# Patient Record
Sex: FEMALE | Race: WHITE
[De-identification: names, ages, dates, MRNs, and addresses within clinical notes are randomized per-mention and may not be internally consistent; named-entity substitution may affect disease eponyms.]

---

## 2018-05-04 ENCOUNTER — HOSPITAL ENCOUNTER (OUTPATIENT)
Dept: HOSPITAL 76 - DI.S | Age: 23
Discharge: HOME | End: 2018-05-04
Attending: NURSE PRACTITIONER
Payer: MEDICAID

## 2018-05-04 DIAGNOSIS — M25.512: Primary | ICD-10-CM

## 2018-05-04 NOTE — XRAY REPORT
TWO-VIEW LEFT SHOULDER:  05/04/2018

 

CLINICAL INDICATION:  Pain.

 

FINDINGS:  Frontal and scapular Y views of the left shoulder demonstrate no evidence of 

fracture or dislocation.  The joint spaces are preserved.  No radiopaque foreign body is seen 

in the soft tissues.

 

IMPRESSION:  NORMAL LEFT SHOULDER.

 

 

DD: 05/04/2018 11:03

TD: 05/04/2018 12:55

Job #: [id number]

## 2018-07-26 ENCOUNTER — HOSPITAL ENCOUNTER (OUTPATIENT)
Dept: HOSPITAL 76 - DI | Age: 23
Discharge: HOME | End: 2018-07-26
Attending: NURSE PRACTITIONER
Payer: MEDICAID

## 2018-07-26 DIAGNOSIS — M75.42: ICD-10-CM

## 2018-07-26 DIAGNOSIS — M25.512: Primary | ICD-10-CM

## 2018-07-26 NOTE — MRI REPORT
Procedure Date:  07/26/2018   

Accession Number:  [id number] / [id number]                    

Procedure:  MRI - Shoulder LT W/O CPT Code:  

 

FULL RESULT:

 

 

EXAM:

LEFT SHOULDER MRI WITHOUT CONTRAST

 

EXAM DATE: 7/26/2018 01:28 PM.

 

CLINICAL HISTORY: Pain in left shoulder, impingement syndrome.

 

COMPARISON: None.

 

TECHNIQUE: Multiplanar, multisequence T1-weighted and fluid-sensitive 

sequences of the shoulder without contrast. Other: None.

 

FINDINGS:

Acromioclavicular Region: The acromion is type II. The acromioclavicular 

joint is unremarkable. No subacromial/subdeltoid bursal fluid.

 

Glenohumeral Region: No subluxation. No effusion or loose bodies. There 

is focal thinning of the hyaline cartilage in the posterior margin of the 

bony glenoid, with mild underlying bony edema. Hyaline cartilage in the 

remainder of the glenohumeral joint appears normal.

 

Bone Marrow: No fracture, marrow edema or bone lesions.

 

Labrum: The labrum appears unremarkable.

 

Musculature/Rotator Cuff: The subscapularis, supraspinatus, 

infraspinatus, and teres minor tendons are intact. No edema or fatty 

atrophy.

 

Biceps Tendon: The long head of the biceps tendon and biceps pulley are 

intact.

 

Other: The subcutaneous tissues are unremarkable.

IMPRESSION:

1. Focal osteochondral injury of the posterior margin of the bony glenoid 

suggesting a prior posterior dislocation or subluxation.

2. The rotator cuff and acromioclavicular joint appear normal.

 

[facility] MUSCULOSKELET[facility] RADIOLOGY SECTION

## 2018-10-17 ENCOUNTER — HOSPITAL ENCOUNTER (OUTPATIENT)
Dept: HOSPITAL 76 - DI | Age: 23
Discharge: HOME | End: 2018-10-17
Attending: ORTHOPAEDIC SURGERY
Payer: MEDICAID

## 2018-10-17 DIAGNOSIS — M25.512: Primary | ICD-10-CM

## 2018-10-17 PROCEDURE — 77002 NEEDLE LOCALIZATION BY XRAY: CPT

## 2018-10-17 PROCEDURE — 23350 INJECTION FOR SHOULDER X-RAY: CPT

## 2018-10-17 PROCEDURE — 73222 MRI JOINT UPR EXTREM W/DYE: CPT

## 2018-10-17 NOTE — MRI REPORT
Reason:  PAIN IN LEFT SHOULDER

Procedure Date:  10/17/2018   

Accession Number:  [id number] / [id number]                    

Procedure:  MRI - Arthrogram Shoulder LT CPT Code:  

 

FULL RESULT:

 

 

EXAM:

LEFT SHOULDER MRI ARTHROGRAM WITH CONTRAST

 

EXAM DATE: 10/17/2018 10:06 AM.

 

CLINICAL HISTORY: Pain in left shoulder. "Focal osteochondral injury of 

the posterior margin of the bony glenoid". Described on the MRI left 

shoulder without contrast 07/26/2018.

 

COMPARISON: Arthrogram 10/17/2018 9:44 AM.

Shoulder left without contrast 07/26/2018 12:59 PM.

 

TECHNIQUE: Multiplanar, multisequence T1-weighted and fluid-sensitive 

sequences of the shoulder after an arthrographic injection of dilute 

gadolinium, dictated under a separate exam. Other: None.

 

FINDINGS:

Acromioclavicular Region: The acromion is type II. The acromioclavicular 

joint is unremarkable. The coracoacromial and coracoclavicular ligaments 

are intact. There is no contrast or fluid in the subacromial/subdeltoid 

bursa.

 

Glenohumeral Region: No subluxation. No loose bodies. Probable focal 

chondromalacia mid posterior glenoid at the junction with the acetabular 

labrum on axial proton density fat saturation MRI sequence 07/26/2018. 

Corresponding mild concave defect on the axial fat saturation T1-weighted 

arthrogram sequence. The glenohumeral ligaments and joint capsule are 

unremarkable.

 

Bone Marrow: No fracture, marrow edema or bone lesions.

 

Labrum: Negative for complete contrast signal labrum tear.

 

Biceps Tendon: The long head of the biceps tendon and biceps pulley are 

intact.

 

Musculature/Rotator Cuff: The subscapularis, supraspinatus, 

infraspinatus, and teres minor tendons are intact. No edema or fatty 

atrophy.

 

Other: The subcutaneous tissues are unremarkable.

IMPRESSION:

1. Negative for rotator cuff tear or internal derangement.

2. Negative for complete contrast signal labrum tear. There is probable 

focal severe chondromalacia mid posterior glenoid articular cartilage 

labrum interface.

 

[facility] MUSCULOSKELETAL RADIOLOGY SECTION

## 2018-10-17 NOTE — XRAY REPORT
Reason:  PAIN IN LEFT SHOULDER

Procedure Date:  10/17/2018   

Accession Number:  [id number] / [id number]                    

Procedure:  FL  - Arthrogram Needle Placement CPT Code:  

 

FULL RESULT:

 

 

EXAM:

FLUOROSCOPIC LEFT SHOULDER ARTHROGRAM

 

EXAM DATE: 10/17/2018 10:03 AM.

 

CLINICAL HISTORY: PAIN IN LEFT SHOULDER.

 

COMPARISON: None.

 

FINDINGS: The risks and benefits of the procedure were discussed with the 

patient, and she desired to proceed.

 

A standard timeout was performed which identified the left shoulder as 

the correct joint as well as patient name and date of birth.

 

The patient was prepped and draped in normal sterile fashion.

 

6 cc of 1% lidocaine were injected into the soft tissues for anesthesia.

 

Using a 20-gauge needle the left shoulder joint was accessed under 

fluoroscopic guidance, and 12 cc of solution were injected into the 

joint. The solution contained 10 cc lidocaine 1%, 10 cc Conray, and a 0.1 

cc Gadavist.

 

There were no immediate complications. The patient was escorted to the 

MRI suite.

IMPRESSION: Successful contrast injection of the left shoulder

[facility]

## 2018-10-26 ENCOUNTER — HOSPITAL ENCOUNTER (OUTPATIENT)
Dept: HOSPITAL 76 - LAB.R | Age: 23
Discharge: HOME | End: 2018-10-26
Attending: NURSE PRACTITIONER
Payer: MEDICAID

## 2018-10-26 DIAGNOSIS — Z20.2: Primary | ICD-10-CM

## 2018-10-26 PROCEDURE — 87591 N.GONORRHOEAE DNA AMP PROB: CPT

## 2018-10-26 PROCEDURE — 87491 CHLMYD TRACH DNA AMP PROBE: CPT

## 2018-10-29 ENCOUNTER — HOSPITAL ENCOUNTER (OUTPATIENT)
Dept: HOSPITAL 76 - LAB.S | Age: 23
Discharge: HOME | End: 2018-10-29
Attending: NURSE PRACTITIONER
Payer: MEDICAID

## 2018-10-29 DIAGNOSIS — Z20.2: Primary | ICD-10-CM

## 2018-10-29 PROCEDURE — 36415 COLL VENOUS BLD VENIPUNCTURE: CPT

## 2018-10-29 PROCEDURE — 87389 HIV-1 AG W/HIV-1&-2 AB AG IA: CPT

## 2018-10-29 PROCEDURE — 81599 UNLISTED MAAA: CPT

## 2018-10-29 PROCEDURE — 86803 HEPATITIS C AB TEST: CPT

## 2018-10-29 PROCEDURE — 86695 HERPES SIMPLEX TYPE 1 TEST: CPT

## 2018-10-29 PROCEDURE — 86592 SYPHILIS TEST NON-TREP QUAL: CPT

## 2018-10-29 PROCEDURE — 86696 HERPES SIMPLEX TYPE 2 TEST: CPT

## 2018-10-30 LAB
HEPATITIS C ANTIBODY: (no result)
HIV AG/AB 4TH GEN: (no result)
SIGNAL TO CUT-OFF: 0.1 (ref ?–1)

## 2018-10-31 LAB
HSV 1 IGG TYPE SPECIFIC AB: <0.9 INDEX
HSV 2 IGG TYPE SPECIFIC AB: <0.9 INDEX

## 2020-10-19 ENCOUNTER — HOSPITAL ENCOUNTER (OUTPATIENT)
Dept: HOSPITAL 76 - LAB.S | Age: 25
Discharge: HOME | End: 2020-10-19
Attending: NURSE PRACTITIONER
Payer: COMMERCIAL

## 2020-10-19 DIAGNOSIS — Z01.419: Primary | ICD-10-CM

## 2020-10-19 LAB
ALBUMIN DIAFP-MCNC: 4.6 G/DL (ref 3.2–5.5)
ALBUMIN/GLOB SERPL: 1.5 {RATIO} (ref 1–2.2)
ALP SERPL-CCNC: 59 IU/L (ref 42–121)
ALT SERPL W P-5'-P-CCNC: 11 IU/L (ref 10–60)
ANION GAP SERPL CALCULATED.4IONS-SCNC: 9 MMOL/L (ref 6–13)
AST SERPL W P-5'-P-CCNC: 10 IU/L (ref 10–42)
BASOPHILS NFR BLD AUTO: 0 10^3/UL (ref 0–0.1)
BASOPHILS NFR BLD AUTO: 0.4 %
BILIRUB BLD-MCNC: 1.2 MG/DL (ref 0.2–1)
BUN SERPL-MCNC: 7 MG/DL (ref 6–20)
CALCIUM UR-MCNC: 9.6 MG/DL (ref 8.5–10.3)
CHLORIDE SERPL-SCNC: 105 MMOL/L (ref 101–111)
CO2 SERPL-SCNC: 26 MMOL/L (ref 21–32)
CREAT SERPLBLD-SCNC: 0.7 MG/DL (ref 0.4–1)
EOSINOPHIL # BLD AUTO: 0.2 10^3/UL (ref 0–0.7)
EOSINOPHIL NFR BLD AUTO: 2.6 %
ERYTHROCYTE [DISTWIDTH] IN BLOOD BY AUTOMATED COUNT: 12.6 % (ref 12–15)
GLOBULIN SER-MCNC: 3 G/DL (ref 2.1–4.2)
GLUCOSE SERPL-MCNC: 92 MG/DL (ref 70–100)
HGB UR QL STRIP: 13.5 G/DL (ref 12–16)
LYMPHOCYTES # SPEC AUTO: 1.6 10^3/UL (ref 1.5–3.5)
LYMPHOCYTES NFR BLD AUTO: 23.3 %
MCH RBC QN AUTO: 30.7 PG (ref 27–31)
MCHC RBC AUTO-ENTMCNC: 33.1 G/DL (ref 32–36)
MCV RBC AUTO: 92.7 FL (ref 81–99)
MONOCYTES # BLD AUTO: 0.3 10^3/UL (ref 0–1)
MONOCYTES NFR BLD AUTO: 4.8 %
NEUTROPHILS # BLD AUTO: 4.7 10^3/UL (ref 1.5–6.6)
NEUTROPHILS # SNV AUTO: 6.9 X10^3/UL (ref 4.8–10.8)
NEUTROPHILS NFR BLD AUTO: 68.5 %
PDW BLD AUTO: 9.4 FL (ref 7.9–10.8)
PLATELET # BLD: 273 10^3/UL (ref 130–450)
PROT SPEC-MCNC: 7.6 G/DL (ref 6.7–8.2)
RBC MAR: 4.4 10^6/UL (ref 4.2–5.4)
SODIUM SERPLBLD-SCNC: 140 MMOL/L (ref 135–145)

## 2020-10-19 PROCEDURE — 84443 ASSAY THYROID STIM HORMONE: CPT

## 2020-10-19 PROCEDURE — 80053 COMPREHEN METABOLIC PANEL: CPT

## 2020-10-19 PROCEDURE — 87389 HIV-1 AG W/HIV-1&-2 AB AG IA: CPT

## 2020-10-19 PROCEDURE — 36415 COLL VENOUS BLD VENIPUNCTURE: CPT

## 2020-10-19 PROCEDURE — 85025 COMPLETE CBC W/AUTO DIFF WBC: CPT

## 2020-10-20 LAB — HIV AG/AB 4TH GEN: (no result)

## 2021-01-04 ENCOUNTER — HOSPITAL ENCOUNTER (OUTPATIENT)
Dept: HOSPITAL 76 - LAB.R | Age: 26
Discharge: HOME | End: 2021-01-04
Attending: OBSTETRICS & GYNECOLOGY
Payer: COMMERCIAL

## 2021-01-04 DIAGNOSIS — Z11.3: Primary | ICD-10-CM

## 2021-01-04 PROCEDURE — 87661 TRICHOMONAS VAGINALIS AMPLIF: CPT

## 2021-01-04 PROCEDURE — 87591 N.GONORRHOEAE DNA AMP PROB: CPT

## 2021-01-04 PROCEDURE — 87491 CHLMYD TRACH DNA AMP PROBE: CPT

## 2021-01-05 LAB — T VAGINALIS RRNA GENITAL QL PROBE: NEGATIVE
